# Patient Record
Sex: MALE | Race: BLACK OR AFRICAN AMERICAN | NOT HISPANIC OR LATINO | Employment: UNEMPLOYED | URBAN - METROPOLITAN AREA
[De-identification: names, ages, dates, MRNs, and addresses within clinical notes are randomized per-mention and may not be internally consistent; named-entity substitution may affect disease eponyms.]

---

## 2021-03-01 ENCOUNTER — HISTORICAL (OUTPATIENT)
Dept: RADIOLOGY | Facility: HOSPITAL | Age: 46
End: 2021-03-01

## 2021-09-20 ENCOUNTER — HISTORICAL (OUTPATIENT)
Dept: ADMINISTRATIVE | Facility: HOSPITAL | Age: 46
End: 2021-09-20

## 2021-12-16 ENCOUNTER — PATIENT OUTREACH (OUTPATIENT)
Dept: EMERGENCY MEDICINE | Facility: HOSPITAL | Age: 46
End: 2021-12-16

## 2021-12-29 ENCOUNTER — HISTORICAL (OUTPATIENT)
Dept: PHYSICAL THERAPY | Facility: HOSPITAL | Age: 46
End: 2021-12-29

## 2022-01-26 ENCOUNTER — HISTORICAL (OUTPATIENT)
Dept: PHYSICAL THERAPY | Facility: HOSPITAL | Age: 47
End: 2022-01-26

## 2022-02-11 ENCOUNTER — PATIENT OUTREACH (OUTPATIENT)
Dept: EMERGENCY MEDICINE | Facility: HOSPITAL | Age: 47
End: 2022-02-11

## 2022-05-13 ENCOUNTER — PATIENT OUTREACH (OUTPATIENT)
Dept: EMERGENCY MEDICINE | Facility: HOSPITAL | Age: 47
End: 2022-05-13
Payer: MEDICAID

## 2022-05-24 ENCOUNTER — PATIENT OUTREACH (OUTPATIENT)
Dept: EMERGENCY MEDICINE | Facility: HOSPITAL | Age: 47
End: 2022-05-24
Payer: MEDICAID

## 2022-11-04 ENCOUNTER — IMMUNIZATION (OUTPATIENT)
Dept: PHARMACY | Facility: CLINIC | Age: 47
End: 2022-11-04
Payer: MEDICAID

## 2022-11-27 ENCOUNTER — HOSPITAL ENCOUNTER (EMERGENCY)
Facility: HOSPITAL | Age: 47
Discharge: HOME | End: 2022-11-27
Attending: STUDENT IN AN ORGANIZED HEALTH CARE EDUCATION/TRAINING PROGRAM
Payer: MEDICAID

## 2022-11-27 VITALS
SYSTOLIC BLOOD PRESSURE: 130 MMHG | RESPIRATION RATE: 20 BRPM | BODY MASS INDEX: 25.92 KG/M2 | WEIGHT: 175 LBS | OXYGEN SATURATION: 100 % | TEMPERATURE: 98 F | DIASTOLIC BLOOD PRESSURE: 83 MMHG | HEIGHT: 69 IN | HEART RATE: 74 BPM

## 2022-11-27 DIAGNOSIS — L02.811 ABSCESS, SCALP: Primary | ICD-10-CM

## 2022-11-27 PROCEDURE — 99283 EMERGENCY DEPT VISIT LOW MDM: CPT

## 2022-11-27 RX ORDER — CLINDAMYCIN HYDROCHLORIDE 300 MG/1
300 CAPSULE ORAL EVERY 6 HOURS
Qty: 40 CAPSULE | Refills: 0 | Status: SHIPPED | OUTPATIENT
Start: 2022-11-27 | End: 2022-12-07

## 2022-11-27 NOTE — ED PROVIDER NOTES
"Encounter Date: 11/27/2022       History     Chief Complaint   Patient presents with    Headache     Patient reports a knot in back of head for three days worst upon palpation no redness denies fall or injury.     46 M presents to Ed stating he has a "knot" in back of his head x 3 days. Woke up with it there, denies any trauma or inciting event. Shaved head a few days ago. No fevers, chills, bleeding or drainage. No dizziness, syncope.    Review of patient's allergies indicates:  No Known Allergies  No past medical history on file.  No past surgical history on file.  No family history on file.     Review of Systems   Skin:         Neg except as stated   Neurological:  Positive for headaches.   All other systems reviewed and are negative.    Physical Exam     Initial Vitals [11/27/22 0944]   BP Pulse Resp Temp SpO2   130/83 74 20 98.2 °F (36.8 °C) 100 %      MAP       --         Physical Exam    Constitutional: He appears well-developed and well-nourished.   HENT:   Head: Normocephalic.   Eyes: EOM are normal. Pupils are equal, round, and reactive to light.   Neck:   Normal range of motion.  Cardiovascular:  Normal rate, regular rhythm and normal pulses.           Pulmonary/Chest: Breath sounds normal. No respiratory distress.   Abdominal: Abdomen is soft. Bowel sounds are normal. There is no abdominal tenderness.   Musculoskeletal:         General: Normal range of motion.      Cervical back: Normal range of motion.     Neurological: He is alert.   Skin: Skin is warm. Capillary refill takes less than 2 seconds.   2cm abscess palpated to R occipital scalp, no erythema, drainage or bleeding, +indurated, edematous tissue   Psychiatric: He has a normal mood and affect.       ED Course   Procedures  Labs Reviewed - No data to display       Imaging Results    None          Medications - No data to display                           Clinical Impression:   Final diagnoses:  [L02.811] Abscess, scalp (Primary)        ED " Disposition Condition    Discharge Stable          ED Prescriptions       Medication Sig Dispense Start Date End Date Auth. Provider    clindamycin (CLEOCIN) 300 MG capsule Take 1 capsule (300 mg total) by mouth every 6 (six) hours. for 10 days 40 capsule 11/27/2022 12/7/2022 Chloe Ellis MD          Follow-up Information       Follow up With Specialties Details Why Contact Info    pcp                 Chloe Ellis MD  11/27/22 4011

## 2022-11-27 NOTE — ED NOTES
Pt reports small area of tenderness/swelling he noted x3 days now to rt occiptal region of scalp.- denies trauma. Skin intact/free of drianage.

## 2022-12-22 NOTE — PROGRESS NOTES
Original encounter on date 2/11/2022 in [employer] EMR.  Information placed in EPIC for continuity purposes.  First follow up.               Document Has Been Revised    HealthE Care Entered On:  2/11/2022 9:52 CST    Performed On:  2/11/2022 9:37 CST by Lu Kwan LPN               Discharge Past 30 Days   Visit to the Hospital in the Last 30 Days :   Emergency department (ED) visit   Reason for Choosing ED for Care :   Believes the problem too serious for doctor office or clinic   Perception of Change in Health Status DC :   Improving   Discharged To :   Home independently   DC Instructions :   Received discharge instructions , Understands discharge instructions    Education Provided :   Chronic disease process, ED utilization, Importance of keeping appointments, Community resources, Medication Compliance, Diet Compliance, Other: benefits of pcp and all providers and ancillary care. stressed compliance with medication, health care and treatment plan.   (Comment: diabetic diet, drink water [Lu Kwan LPN - 2/11/2022 9:37 CST] )   Discharge Past 30 Days Addntl Comments :   spoke to pt for f/u ed visit, pt reports he is feeling better and had f/u appt with pcp on yesterday 2/10/22 and did on his way to obtain lab work for pcp and that pcp will call him with f/u appt. pt reports cbg 328 this am. stressed importance of f/u care with pcp, all providers and ancillary care. discussed risk of non compliant to health care and treatment plan and encouraged health care and treatment plan compliance, monitor and log blood sugar and smoke cessation. pt reports he now realize that he has to and started taking his medication as directed and checking his blood sugars.  pt voices understanding to instructions given and appreciation.     Patient pregnant :   N/A   Lu Kwan LPN - 2/11/2022 9:37 CST   Barriers to Care   SDoH Eat Less Than You Should :   No   SDoH Shut Off Services to Your Home  :   No   SDoH No Regular Place to Live :   No   SDoH Needed Provider but Costs too Much :   No   SDoH Help Reading and Writing Paper Work :   No   SDoH Feel Lonely Often :   No   SDoH Missed Appt/Meds- No Transportation :   No   SDoH Call Dr To Be Seen Right Away :   Yes   SDoH Medical Problems Cause ED Visit :   No   SDoH Other Problems Affecting Health :   No   SDoH Reviewed :   Yes   SDoH Dentist Seen in Last Year :   Yes   Lu Kwan LPN - 2/11/2022 9:37 CST   Prescriptions   Medication Reconcilation Completed :   Unknown   Medication Prescriptions Filled After DC :   No medications prescribed at discharge   Questions About Meds Prescribed at DC :   Denies any questions or concerns                    Lu Kwan LPN - 2/11/2022 9:37 CST   Appointments   Follow-Up Appt Scheduled :   No   Follow-Up Appointment Status :   Appointment not available within the designated time frame   PCP Visit Upcoming Reason :   Sick   PCP Visit Within Year :   Yes   PCP Visit Upcoming Reason :   Sick   PCP Visit One Year Date :   2/10/2022 CST   Follow-Up Specialist Appt Scheduled :   No   Lu Kwan LPN - 2/11/2022 9:37 CST   Navigation   Initial Assesment Completed By :   Phone   ED FIN :   [id number]   [facility] Navigation Call Log :   First follow up call   Transportation Arrangements Made :   Yes   Lu Kwan LPN - 2/11/2022 9:37 CST   Providers Patient Visited Last Year :   MICAH LAYNE MD  [facility] DENTIST   [facility]   Lu Kwan LPN - 2/11/2022 10:05 CST   Root Causes for High-ED Utilization :   Chronic conditions   Plan: :   Educated on appropriate ED utilization, Educated on alternate means of health care; ie urgent care when PCP not available, Educated on importance of follow up care with PCP, Educated on importance of follow up preventative dental care with dentist, Other: PT REPORTS HE DID NOT RECEIVE MAIL YET,  BUT DID NOT CHECK AT HIS MOM HOUSE, WHERE HE REQUEST TO SEND MAIL AND REQUEST TO SEND IT AGAIN, VERIFIED PT ADDRESS WITH PT   Participation in Activity Designed to Address Lack of Annual Ambulatory or Preventative Care Visit? :   Yes   Participation in Activity Designed to Address Avoidable ED Utilization? :   Yes   During Current Measurement Year, Did Enrollee Receive Education Regarding Outpatient Primary Care Options? :   Yes   During Current Measurement Year, Did Enrollee Receive an Appt Reminder 24-48 Hours Before a Scheduled Appt? :   Yes   During Current Measurement Year, Did the Network Provider Schedule and Appt or Provide a Referral to Enrollee? :   Yes   Education Provided :   Verbal, Written   Lu Kwan LPN - 2/11/2022 9:37 CST

## 2022-12-22 NOTE — PROGRESS NOTES
Original encounter on date 12/16/2021 in [employer] EMR.  Information placed in EPIC for continuity purposes.                 HealthE Care Entered On:  12/16/2021 9:17 CST    Performed On:  12/16/2021 8:58 CST by Lu Kwan LPN               Discharge Past 30 Days   Visit to the Hospital in the Last 30 Days :   Emergency department (ED) visit   Reason for Choosing ED for Care :   Believes the problem too serious for doctor office or clinic   Perception of Change in Health Status DC :   Improving   Discharged To :   Home independently   DC Instructions :   Received discharge instructions , Understands discharge instructions    Education Provided :   Chronic disease process, ED utilization, Importance of keeping appointments, Community resources, Medication Compliance, Diet Compliance, Other: BENEFITS OF PCP AND ANCILLARY CARE, ORAL CARE/DENTAL PROVIDER OPTIONS   (Comment: DIABETE DIET [Lu Kwan LPN - 12/16/2021 8:58 CST] )   Discharge Past 30 Days Addntl Comments :   SPOKE TO PT FOR F/U ED VISIT. PT REPORTS HE IS FEELING BETTER, SWELLING WENT DOWN AND NO MORE PAIN, PT REPORTS HE DID NOT  RXS YET FROM PHARM AND WILL  THIS AM. STRESSED IMPORTANCE OF MEDICATION  AND DIABETIC DIET COMPLIANCE AND DRINKING  WATER. PT REPORTS THAT DR. MICAH LAYNE IS HIS PCP AND LAST SEEN ON YESTERDAY 12/15/21 PRIOR TO ED VISIT AND NEXT APPT IS 12/29/21. PT ALSO REPORTS HE IS A DIABETIC AND IS TAKING METFORMIN AND HE HAS A GLUCOMETER, BUT DOES NOT CHECK LIKE HE SHOULD. ENCOURAGED PT TO MONITOR BLOOD SUGARS AND LOG AND BRING TO PCP APPTS FOR PROVIDER TO REVIEW. STRESSED IMPORTANCE OF F/U CARE WITH ALL PROVIDERS AND ANCILLARY CARE. [facility] AND CONTINUE F/U CALLS AND REQUEST TO MAIL SDOH EDUCATION/RESOURCE DISCUSSED. PT VOICES UNDERSTANDING TO INSTRUCTIONS GIVEN AND APPRECIATION.     Lu Kwan LPN - 12/16/2021 8:58 CST   Barriers to Care   SDoH Eat Less Than You Should :    No   SDoH Shut Off Services to Your Home :   No   SDoH No Regular Place to Live :   No   SDoH Needed Provider but Costs too Much :   No   SDoH Help Reading and Writing Paper Work :   No   SDoH Feel Lonely Often :   No   SDoH Missed Appt/Meds- No Transportation :   No   SDoH Call Dr To Be Seen Right Away :   Yes   SDoH Medical Problems Cause ED Visit :   No   SDoH Other Problems Affecting Health :   No   SDoH Reviewed :   Yes   SDoH Dentist Seen in Last Year :   Yes   (Comment: [facility] DENTIST [Lu Kwan LPN - 12/16/2021 8:58 CST] )   Lu Kwan LPN - 12/16/2021 8:58 CST   Prescriptions   Medication Reconcilation Completed :   Unknown   Medication Prescriptions Filled After DC :   Other: STRESSED IMPORTANCE FOR PT TO  RXS GIVEN FROM PHARM AND TAKE AS DIRECTED   Questions About Meds Prescribed at DC :   Denies any questions or concerns                    Lu Kwan LPN - 12/16/2021 8:58 CST   Appointments   Follow-Up Appt Scheduled :   Yes   Follow-Up Provider Appt Scheduled By :   Other: OFFICE STAFF   PCP Name :   MICAH LAYNE MD   Follow-Up Date :   12/29/2021 CST   Follow-Up Appointment Status :   Confirms scheduled appointment    PCP Visit Within Year :   Yes   PCP Visit Upcoming Reason :   Regular visit   PCP Visit One Year Date :   12/15/2021 CST   Follow-Up Specialist Appt Scheduled :   No   Lu Kwan LPN - 12/16/2021 8:58 CST   Navigation   Initial Assesment Completed By :   Phone   ED FIN :   [id number]   [facility] Navigation Call Log :   Initial [facility] contact   Referral To  Care :   [facility] Navigation   Transportation Arrangements Made :   Yes   Providers Patient Visited Last Year :   MICAH LAYNE MD  [facility] DENTIST   Root Causes for High-ED Utilization :   Chronic conditions   Plan: :   Educated on appropriate ED utilization, Educated on alternate means of health care; ie urgent care  when PCP not available, Educated on importance of follow up care with PCP, Referred to community resources; ie [place], Educated on importance of follow up preventative dental care with dentist, Other: MAIL [facility] EDUCATION/RESOURCE TO PT PER PT REQUEST, VERIFIED PT ADDRESS WITH PT   Participation in Activity Designed to Address Lack of Annual Ambulatory or Preventative Care Visit? :   Yes   Participation in Activity Designed to Address Avoidable ED Utilization? :   Yes   During Current Measurement Year, Did Enrollee Receive Education Regarding Outpatient Primary Care Options? :   Yes   During Current Measurement Year, Did Enrollee Receive an Appt Reminder 24-48 Hours Before a Scheduled Appt? :   Yes   During Current Measurement Year, Did the Network Provider Schedule and Appt or Provide a Referral to Enrollee? :   Yes   Education Provided :   Verbal, Written   Aquiles HINOJOSA, Lu Fu - 12/16/2021 8:58 CST

## 2023-05-20 ENCOUNTER — HOSPITAL ENCOUNTER (EMERGENCY)
Facility: HOSPITAL | Age: 48
Discharge: HOME | End: 2023-05-20
Attending: STUDENT IN AN ORGANIZED HEALTH CARE EDUCATION/TRAINING PROGRAM
Payer: MEDICAID

## 2023-05-20 VITALS
DIASTOLIC BLOOD PRESSURE: 82 MMHG | OXYGEN SATURATION: 100 % | BODY MASS INDEX: 26.22 KG/M2 | TEMPERATURE: 98 F | SYSTOLIC BLOOD PRESSURE: 153 MMHG | HEIGHT: 69 IN | RESPIRATION RATE: 18 BRPM | HEART RATE: 88 BPM | WEIGHT: 177 LBS

## 2023-05-20 DIAGNOSIS — M25.551 PAIN OF RIGHT HIP: Primary | ICD-10-CM

## 2023-05-20 PROCEDURE — 96372 THER/PROPH/DIAG INJ SC/IM: CPT | Performed by: STUDENT IN AN ORGANIZED HEALTH CARE EDUCATION/TRAINING PROGRAM

## 2023-05-20 PROCEDURE — 99284 EMERGENCY DEPT VISIT MOD MDM: CPT | Mod: 25

## 2023-05-20 PROCEDURE — 63600175 PHARM REV CODE 636 W HCPCS: Performed by: STUDENT IN AN ORGANIZED HEALTH CARE EDUCATION/TRAINING PROGRAM

## 2023-05-20 RX ORDER — DEXAMETHASONE SODIUM PHOSPHATE 4 MG/ML
8 INJECTION, SOLUTION INTRA-ARTICULAR; INTRALESIONAL; INTRAMUSCULAR; INTRAVENOUS; SOFT TISSUE
Status: COMPLETED | OUTPATIENT
Start: 2023-05-20 | End: 2023-05-20

## 2023-05-20 RX ORDER — KETOROLAC TROMETHAMINE 30 MG/ML
60 INJECTION, SOLUTION INTRAMUSCULAR; INTRAVENOUS
Status: COMPLETED | OUTPATIENT
Start: 2023-05-20 | End: 2023-05-20

## 2023-05-20 RX ORDER — DICLOFENAC SODIUM 75 MG/1
75 TABLET, DELAYED RELEASE ORAL 2 TIMES DAILY
Qty: 10 TABLET | Refills: 0 | Status: SHIPPED | OUTPATIENT
Start: 2023-05-20 | End: 2023-05-25

## 2023-05-20 RX ADMIN — KETOROLAC TROMETHAMINE 60 MG: 30 INJECTION, SOLUTION INTRAMUSCULAR at 07:05

## 2023-05-20 RX ADMIN — DEXAMETHASONE SODIUM PHOSPHATE 8 MG: 4 INJECTION, SOLUTION INTRA-ARTICULAR; INTRALESIONAL; INTRAMUSCULAR; INTRAVENOUS; SOFT TISSUE at 07:05

## 2023-05-27 NOTE — ED PROVIDER NOTES
Encounter Date: 5/20/2023       History     Chief Complaint   Patient presents with    Hip Pain     R hip pain x 1 day     48 yo M presents to R hip pain x 1 day. H/o OA, had previous injections, was told he needs hip replacement. Due for FU with Ortho    Review of patient's allergies indicates:  No Known Allergies  History reviewed. No pertinent past medical history.  History reviewed. No pertinent surgical history.  No family history on file.  Social History     Tobacco Use    Smoking status: Never    Smokeless tobacco: Never   Substance Use Topics    Alcohol use: Never    Drug use: Yes     Types: Marijuana     Review of Systems   Constitutional:  Negative for fever.   HENT:  Negative for sore throat.    Respiratory:  Negative for shortness of breath.    Cardiovascular:  Negative for chest pain.   Gastrointestinal:  Negative for nausea.   Genitourinary:  Negative for dysuria.   Musculoskeletal:  Positive for arthralgias. Negative for back pain.   Skin:  Negative for rash.   Neurological:  Negative for weakness.   Hematological:  Does not bruise/bleed easily.     Physical Exam     Initial Vitals [05/20/23 0712]   BP Pulse Resp Temp SpO2   (!) 153/82 92 18 98.3 °F (36.8 °C) 99 %      MAP       --         Physical Exam    Nursing note and vitals reviewed.  Constitutional: He appears well-developed and well-nourished.   HENT:   Head: Normocephalic.   Eyes: EOM are normal. Pupils are equal, round, and reactive to light.   Neck:   Normal range of motion.  Cardiovascular:  Normal rate, regular rhythm and normal pulses.           Pulmonary/Chest: Breath sounds normal. No respiratory distress.   Abdominal: Abdomen is soft. Bowel sounds are normal. There is no abdominal tenderness.   Musculoskeletal:         General: Tenderness (L hip) present. Normal range of motion.      Cervical back: Normal range of motion.     Neurological: He is alert.   Skin: Skin is warm. Capillary refill takes less than 2 seconds.   Psychiatric: He  has a normal mood and affect.       ED Course   Procedures  Labs Reviewed - No data to display       Imaging Results    None          Medications   dexAMETHasone injection 8 mg (8 mg Intramuscular Given 23 07)   ketorolac injection 60 mg (60 mg Intramuscular Given 23)     Medical Decision Making:   Differential Diagnosis:   Hip OA, sciatica, other joint/ligamentous injury  ED Management:  No need for imaging at this time, longstanding issue with exacerbation  Pain improved with tx in ER  FU with Ortho  Er precautions                        Clinical Impression:   Final diagnoses:  [M25.551] Pain of right hip (Primary)        ED Disposition Condition    Discharge Stable          ED Prescriptions       Medication Sig Dispense Start Date End Date Auth. Provider    diclofenac (VOLTAREN) 75 MG EC tablet () Take 1 tablet (75 mg total) by mouth 2 (two) times daily. for 5 days 10 tablet 2023 Chloe Ellis MD          Follow-up Information       Follow up With Specialties Details Why Contact Info    Orthopedics  Schedule an appointment as soon as possible for a visit                Chloe Ellis MD  23 [phone]

## 2023-08-07 ENCOUNTER — HOSPITAL ENCOUNTER (EMERGENCY)
Facility: HOSPITAL | Age: 48
Discharge: HOME | End: 2023-08-07
Attending: EMERGENCY MEDICINE
Payer: MEDICAID

## 2023-08-07 VITALS
WEIGHT: 175 LBS | HEIGHT: 69 IN | OXYGEN SATURATION: 99 % | TEMPERATURE: 98 F | RESPIRATION RATE: 17 BRPM | HEART RATE: 82 BPM | BODY MASS INDEX: 25.92 KG/M2 | DIASTOLIC BLOOD PRESSURE: 68 MMHG | SYSTOLIC BLOOD PRESSURE: 105 MMHG

## 2023-08-07 DIAGNOSIS — G44.219 EPISODIC TENSION-TYPE HEADACHE, NOT INTRACTABLE: Primary | ICD-10-CM

## 2023-08-07 PROCEDURE — 99284 EMERGENCY DEPT VISIT MOD MDM: CPT | Mod: 25

## 2023-08-07 PROCEDURE — 25000003 PHARM REV CODE 250

## 2023-08-07 RX ORDER — BUTALBITAL, ACETAMINOPHEN AND CAFFEINE 50; 325; 40 MG/1; MG/1; MG/1
1 TABLET ORAL
Status: COMPLETED | OUTPATIENT
Start: 2023-08-07 | End: 2023-08-07

## 2023-08-07 RX ORDER — DICLOFENAC SODIUM 50 MG/1
50 TABLET, DELAYED RELEASE ORAL 2 TIMES DAILY
Qty: 15 TABLET | Refills: 0 | Status: SHIPPED | OUTPATIENT
Start: 2023-08-07

## 2023-08-07 RX ORDER — CHLORHEXIDINE GLUCONATE ORAL RINSE 1.2 MG/ML
15 SOLUTION DENTAL 2 TIMES DAILY
Qty: 118 ML | Refills: 0 | Status: SHIPPED | OUTPATIENT
Start: 2023-08-07 | End: 2023-08-21

## 2023-08-07 RX ADMIN — BUTALBITAL, ACETAMINOPHEN, AND CAFFEINE 1 TABLET: 325; 50; 40 TABLET ORAL at 01:08

## 2023-08-07 NOTE — ED PROVIDER NOTES
Encounter Date: 8/7/2023       History     Chief Complaint   Patient presents with    Headache     Pt c/o intermittent frontal HA that started 4 days ago. Pt denies n/v/d and cough/ congestion.      47-year-old male presents to the ER with a 4 day history of headache    The history is provided by the patient. No  was used.     Review of patient's allergies indicates:  No Known Allergies  No past medical history on file.  No past surgical history on file.  No family history on file.  Social History     Tobacco Use    Smoking status: Never    Smokeless tobacco: Never   Substance Use Topics    Alcohol use: Never    Drug use: Yes     Types: Marijuana     Review of Systems   Constitutional: Negative.    HENT:  Positive for sinus pressure.    Eyes: Negative.    Respiratory: Negative.     Cardiovascular: Negative.    Gastrointestinal: Negative.    Endocrine: Negative.    Genitourinary: Negative.    Musculoskeletal: Negative.    Neurological:  Positive for headaches.   Hematological: Negative.    Psychiatric/Behavioral: Negative.     All other systems reviewed and are negative.      Physical Exam     Initial Vitals [08/07/23 1325]   BP Pulse Resp Temp SpO2   135/84 82 17 98.3 °F (36.8 °C) 99 %      MAP       --         Physical Exam    Nursing note and vitals reviewed.  Constitutional: He appears well-developed and well-nourished.   HENT:   Head: Normocephalic and atraumatic.   Right Ear: External ear normal.   Left Ear: External ear normal.   Nose: Nose normal.   Mouth/Throat: Oropharynx is clear and moist.   Eyes: Conjunctivae and EOM are normal. Pupils are equal, round, and reactive to light.   Neck: Neck supple.   Normal range of motion.  Cardiovascular:  Normal rate, regular rhythm, normal heart sounds and intact distal pulses.           Pulmonary/Chest: Breath sounds normal.   Abdominal: Abdomen is soft. Bowel sounds are normal.   Musculoskeletal:         General: Tenderness present. Normal range  of motion.      Cervical back: Normal range of motion and neck supple.     Neurological: He is alert and oriented to person, place, and time. He has normal strength and normal reflexes. GCS score is 15. GCS eye subscore is 4. GCS verbal subscore is 5. GCS motor subscore is 6.   Skin: Skin is warm and dry. Capillary refill takes less than 2 seconds.   Psychiatric: He has a normal mood and affect. His behavior is normal. Judgment and thought content normal.         ED Course   Procedures  Labs Reviewed - No data to display       Imaging Results              CT Head Without Contrast (Final result)  Result time 08/07/23 14:11:36      Final result by Ade Escudero MD (08/07/23 14:11:36)                   Impression:      No acute abnormality seen      Electronically signed by: Ade Escudero  Date:    08/07/2023  Time:    14:11               Narrative:    EXAMINATION:  CT HEAD WITHOUT CONTRAST    CLINICAL HISTORY:  Headache, chronic, new features or increased frequency;    TECHNIQUE:  Multiple axial images were obtained from the base of the brain to the vertex without contrast administration.  Sagittal and coronal reconstructions were performed. .Automatic exposure control  (AEC) is utilized to reduce patient radiation exposure.    COMPARISON:  None    FINDINGS:  There is no intracranial mass or lesion seen.  No hemorrhage is seen.  No infarct is seen.  The ventricles and basilar cisterns appear normal.  Brain parenchyma appears grossly unremarkable.    Posterior fossa appears normal.  The calvarium is intact.  The paranasal sinuses appear grossly unremarkable.                                       Medications   butalbital-acetaminophen-caffeine -40 mg per tablet 1 tablet (1 tablet Oral Given 8/7/23 0075)     Medical Decision Making:   Initial Assessment:   Awake alert and oriented 47-year-old male appears in no acute distress with complaints of a 4 day history of intermittent headache.  Patient  reports headache mainly to the left temporal region.  Patient denies any difficulty with vision.  Vital signs stable.  Afebrile.  Patient reports he has been working on [employer] and heat.PERRLA, EOMI.  No nystagmus or ptosis noted.  No decreased hearing.  Temporal lobes and frontal sinuses.  Bilateral ear canals clear tympanic membranes present.  Denies any muscle weakness, numbness or tingling.  Denies any trauma.  Neck supple full range of motion.  Moves all extremities with good strength distal motor sensory intact good pulses GCS 15 cranial nerves 2-12 intact no focal neuro deficits.  Positive DTRs.  Normal speech gait.  Negative Romberg.  Speech clear gait steady   Differential Diagnosis:   Tension Headache  Sinusitis  TMJ  Clinical Tests:   Radiological Study: Ordered  ED Management:  Patient reports no improvement in pain.  Patient does have several broken teeth and poor dentition.  Mild clicking appreciated at the left temporomandibular joint.  Patient instructed to follow up with a dentist and take anti-inflammatories and instructed                          Clinical Impression:   Final diagnoses:  [G44.219] Episodic tension-type headache, not intractable (Primary)        ED Disposition Condition    Discharge Stable          ED Prescriptions       Medication Sig Dispense Start Date End Date Auth. Provider    chlorhexidine (PERIDEX) 0.12 % solution Use as directed 15 mLs in the mouth or throat 2 (two) times daily. for 14 days 118 mL 8/7/2023 8/21/2023 Katharine Erwin NP    diclofenac (VOLTAREN) 50 MG EC tablet Take 1 tablet (50 mg total) by mouth 2 (two) times daily. 15 tablet 8/7/2023 -- Katharine Erwin NP          Follow-up Information    None          Katharien Erwin NP  08/07/23 2341

## 2023-08-07 NOTE — DISCHARGE INSTRUCTIONS
Patient will be discharged home.  Instructed to follow up with a dentist for evaluation teeth and TMJ.  Take anti-inflammatories as prescribed.  Warm saltwater gargles.  Along with the oral solution.  Return ER for any worsening condition

## 2024-04-08 ENCOUNTER — HOSPITAL ENCOUNTER (EMERGENCY)
Facility: HOSPITAL | Age: 49
Discharge: HOME | End: 2024-04-08
Attending: FAMILY MEDICINE
Payer: MEDICAID

## 2024-04-08 VITALS
RESPIRATION RATE: 18 BRPM | HEART RATE: 90 BPM | DIASTOLIC BLOOD PRESSURE: 84 MMHG | TEMPERATURE: 98 F | OXYGEN SATURATION: 98 % | HEIGHT: 69 IN | WEIGHT: 165 LBS | BODY MASS INDEX: 24.44 KG/M2 | SYSTOLIC BLOOD PRESSURE: 148 MMHG

## 2024-04-08 DIAGNOSIS — M79.606 PAIN AND SWELLING OF LOWER EXTREMITY: ICD-10-CM

## 2024-04-08 DIAGNOSIS — M79.89 PAIN AND SWELLING OF LOWER EXTREMITY: ICD-10-CM

## 2024-04-08 DIAGNOSIS — B35.3 TINEA PEDIS OF LEFT FOOT: ICD-10-CM

## 2024-04-08 DIAGNOSIS — M79.672 FOOT PAIN, LEFT: Primary | ICD-10-CM

## 2024-04-08 PROCEDURE — 99284 EMERGENCY DEPT VISIT MOD MDM: CPT | Mod: 25

## 2024-04-08 RX ORDER — METFORMIN HYDROCHLORIDE 500 MG/1
500 TABLET, EXTENDED RELEASE ORAL 2 TIMES DAILY
COMMUNITY
Start: 2024-04-01

## 2024-04-08 RX ORDER — CLOTRIMAZOLE 1 %
CREAM (GRAM) TOPICAL 2 TIMES DAILY
Qty: 15 G | Refills: 0 | Status: SHIPPED | OUTPATIENT
Start: 2024-04-08

## 2024-04-08 RX ORDER — DICLOFENAC SODIUM 50 MG/1
50 TABLET, DELAYED RELEASE ORAL 3 TIMES DAILY PRN
Qty: 21 TABLET | Refills: 0 | Status: SHIPPED | OUTPATIENT
Start: 2024-04-08 | End: 2024-04-15

## 2024-04-08 RX ORDER — CEPHALEXIN 500 MG/1
500 CAPSULE ORAL EVERY 8 HOURS
Qty: 21 CAPSULE | Refills: 0 | Status: SHIPPED | OUTPATIENT
Start: 2024-04-08 | End: 2024-04-15

## 2024-04-08 NOTE — Clinical Note
"Alfosno Barrera"James was seen and treated in our emergency department on 4/8/2024.  He may return to work on 04/10/2024.       If you have any questions or concerns, please don't hesitate to call.      Vanessa Graves, JULIETH"

## 2024-04-08 NOTE — DISCHARGE INSTRUCTIONS
Dry space between toes well then apply Lotrimin cream  Cephalexin as directed  Diclofenac as needed for pain

## 2024-04-08 NOTE — ED PROVIDER NOTES
Encounter Date: 4/8/2024       History     Chief Complaint   Patient presents with    Foot Pain     Left foot pain for three days denies injury or trauma     48 year old female presents to ER complaining of pain to left foot overlying the distal left 4th/5th MTP. He denies injury. He states he works at [employer] and is constantly bumping himself. He is a diabetic.     The history is provided by the patient. No  was used.     Review of patient's allergies indicates:  No Known Allergies  Past Medical History:   Diagnosis Date    Diabetes mellitus      History reviewed. No pertinent surgical history.  No family history on file.  Social History     Tobacco Use    Smoking status: Never    Smokeless tobacco: Never   Substance Use Topics    Alcohol use: Never    Drug use: Yes     Types: Marijuana     Review of Systems   Constitutional:  Negative for fever.   Musculoskeletal:  Positive for arthralgias.   Skin:  Negative for color change and wound.   All other systems reviewed and are negative.      Physical Exam     Initial Vitals [04/08/24 1452]   BP Pulse Resp Temp SpO2   (!) 148/84 90 18 98.2 °F (36.8 °C) 98 %      MAP       --         Physical Exam    Constitutional: He appears well-developed and well-nourished.   HENT:   Head: Normocephalic.   Eyes: EOM are normal.   Neck: Neck supple.   Cardiovascular:  Intact distal pulses.           Pulmonary/Chest: No respiratory distress.   Abdominal: He exhibits no distension.   Musculoskeletal:         General: Tenderness present. Normal range of motion.      Cervical back: Neck supple.        Legs:      Neurological: He is alert and oriented to person, place, and time.   Skin: Capillary refill takes less than 2 seconds. Rash noted. No erythema.        Psychiatric: He has a normal mood and affect.         ED Course   Procedures  Labs Reviewed - No data to display       Imaging Results              X-Ray Foot Complete Left (Preliminary result)  Result time  04/08/24 15:41:58      Wet Read by Vanessa Graves FNP (04/08/24 15:41:58, [facility] - Emergency Dept, Emergency Medicine)    No fracture, dislocation or foreign body                                     Medications - No data to display  Medical Decision Making  DDX: contusion, closed fracture, fungal infection, cellulitis    48-year-old male complaining of left foot pain.  No obvious deformity.  Mild tenderness over the distal 4th and 5th MTPs.  Patient does have a slight fungal infection of the webspaces.  He is diabetic.  X-rays without fracture foreign body.  Will treat the fungal infection and put patient on Keflex in case of an early secondary bacterial infection given he is diabetic.    Amount and/or Complexity of Data Reviewed  Radiology: ordered. Decision-making details documented in ED Course.                                      Clinical Impression:  Final diagnoses:  [M79.606, M79.89] Pain and swelling of lower extremity  [M79.672] Foot pain, left (Primary)  [B35.3] Tinea pedis of left foot          ED Disposition Condition    Discharge Stable          ED Prescriptions       Medication Sig Dispense Start Date End Date Auth. Provider    diclofenac (VOLTAREN) 50 MG EC tablet Take 1 tablet (50 mg total) by mouth 3 (three) times daily as needed (pain). 21 tablet 4/8/2024 4/15/2024 Vanessa Graves FNP    clotrimazole (LOTRIMIN) 1 % cream Apply topically 2 (two) times daily. 15 g 4/8/2024 -- Vanessa Graves FNP    cephALEXin (KEFLEX) 500 MG capsule Take 1 capsule (500 mg total) by mouth every 8 (eight) hours. for 7 days 21 capsule 4/8/2024 4/15/2024 Vanessa Graves FNP          Follow-up Information    None          Vanessa Graves FNP  04/08/24 7479

## 2025-06-26 ENCOUNTER — HOSPITAL ENCOUNTER (EMERGENCY)
Facility: HOSPITAL | Age: 50
Discharge: HOME | End: 2025-06-26
Attending: STUDENT IN AN ORGANIZED HEALTH CARE EDUCATION/TRAINING PROGRAM
Payer: MEDICAID

## 2025-06-26 VITALS
WEIGHT: 178 LBS | SYSTOLIC BLOOD PRESSURE: 148 MMHG | OXYGEN SATURATION: 98 % | BODY MASS INDEX: 26.36 KG/M2 | TEMPERATURE: 98 F | DIASTOLIC BLOOD PRESSURE: 84 MMHG | HEIGHT: 69 IN | HEART RATE: 78 BPM | RESPIRATION RATE: 18 BRPM

## 2025-06-26 DIAGNOSIS — R52 PAIN: ICD-10-CM

## 2025-06-26 DIAGNOSIS — M25.551 PAIN OF RIGHT HIP JOINT: Primary | ICD-10-CM

## 2025-06-26 PROCEDURE — 63600175 PHARM REV CODE 636 W HCPCS: Mod: JZ,TB | Performed by: STUDENT IN AN ORGANIZED HEALTH CARE EDUCATION/TRAINING PROGRAM

## 2025-06-26 PROCEDURE — 99284 EMERGENCY DEPT VISIT MOD MDM: CPT | Mod: 25

## 2025-06-26 PROCEDURE — 96372 THER/PROPH/DIAG INJ SC/IM: CPT | Performed by: STUDENT IN AN ORGANIZED HEALTH CARE EDUCATION/TRAINING PROGRAM

## 2025-06-26 RX ORDER — KETOROLAC TROMETHAMINE 30 MG/ML
30 INJECTION, SOLUTION INTRAMUSCULAR; INTRAVENOUS
Status: COMPLETED | OUTPATIENT
Start: 2025-06-26 | End: 2025-06-26

## 2025-06-26 RX ORDER — KETOROLAC TROMETHAMINE 10 MG/1
10 TABLET, FILM COATED ORAL 3 TIMES DAILY
Qty: 15 TABLET | Refills: 0 | Status: SHIPPED | OUTPATIENT
Start: 2025-06-26 | End: 2025-07-01

## 2025-06-26 RX ADMIN — KETOROLAC TROMETHAMINE 30 MG: 30 INJECTION, SOLUTION INTRAMUSCULAR; INTRAVENOUS at 10:06

## 2025-06-26 NOTE — ED PROVIDER NOTES
Encounter Date: 6/26/2025       History     Chief Complaint   Patient presents with    Hip Pain     R hip pain x 2 days -denies injury -pain radiates to R post leg      Patient is a 49-year-old  gentleman with a history of osteoarthritis, diabetes who presented to the ER today due to right-sided hip pain.  He states he has had issues with his right hip for many years and he actually has such severe arthritis that he was recommended to have a hip replacement.  He states he went to see the orthopedic surgeon who scheduled him for surgery but patient states he never showed up.  Patient states that he would like to try conservative management only.  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  He has not tried anything for relief and states injections with Toradol often times work in the ER.  He denies any lower leg swelling or any direct trauma to the hip.  He denies any other complaints.      Review of patient's allergies indicates:  No Known Allergies  Past Medical History:   Diagnosis Date    Diabetes mellitus      History reviewed. No pertinent surgical history.  No family history on file.  Social History[1]  Review of Systems   Constitutional:  Negative for chills, fatigue and fever.   HENT:  Negative for congestion, sore throat and trouble swallowing.    Eyes:  Negative for pain and visual disturbance.   Respiratory:  Negative for cough, shortness of breath and wheezing.    Cardiovascular:  Negative for chest pain and palpitations.   Gastrointestinal:  Negative for abdominal pain, blood in stool, constipation, diarrhea, nausea and vomiting.   Genitourinary:  Negative for dysuria and hematuria.   Musculoskeletal:  Positive for arthralgias. Negative for back pain and myalgias.   Skin:  Negative for rash and wound.   Neurological:  Negative for seizures, syncope and headaches.   Psychiatric/Behavioral:  Negative for confusion.  The patient is not nervous/anxious.        Physical Exam     Initial Vitals [06/26/25 0958]   BP Pulse Resp Temp SpO2   (!) 151/91 94 18 97.9 °F (36.6 °C) 100 %      MAP       --         Physical Exam    Nursing note and vitals reviewed.  Constitutional: He appears well-developed and well-nourished. No distress.   HENT:   Head: Normocephalic and atraumatic.   Eyes: Conjunctivae and EOM are normal. Right eye exhibits no discharge. Left eye exhibits no discharge. No scleral icterus.   Neck: No tracheal deviation present.   Normal range of motion.  Cardiovascular:  Normal rate, regular rhythm and normal heart sounds.     Exam reveals no gallop and no friction rub.       No murmur heard.  Pulmonary/Chest: Breath sounds normal. No respiratory distress. He has no wheezes. He has no rhonchi. He has no rales.   Musculoskeletal:         General: No edema. Normal range of motion.      Cervical back: Normal range of motion.     Neurological: He is alert.   Skin: Skin is warm and dry. No rash and no abscess noted. No erythema. No pallor.   Psychiatric: His behavior is normal. Judgment normal.         ED Course   Procedures  Labs Reviewed - No data to display       Imaging Results              X-Ray Hip 2 or 3 views Right with Pelvis when performed (Final result)  Result time 06/26/25 11:03:12      Final result by Hussein Bowie MD (06/26/25 11:03:12)                   Impression:      No acute osseous process appreciated.      Electronically signed by: Hussein Bowie  Date:    06/26/2025  Time:    11:03               Narrative:    EXAMINATION:  XR HIP WITH PELVIS WHEN PERFORMED 2 OR 3 VIEWS RIGHT    CLINICAL HISTORY:  Pain, unspecified    TECHNIQUE:  AP view of the pelvis with frontal and frog leg lateral views of the right hip    COMPARISON:  Radiographs 03/01/2021    FINDINGS:  No acute fracture identified.  Right hip aligned.  No significant joint space narrowing.                                       Medications  "  ketorolac injection 30 mg (30 mg Intramuscular Given 6/26/25 1041)     Medical Decision Making  Differentials:  Arthritis, AVN, fracture  History was provided by the patient   49-year-old well-appearing male presents to the ER today with a chronic right-sided hip pain.  It all seem to be provoked with certain overuse.  Toradol given as patient responded well to this in the past and he verbalized improvement.  X-ray showed no acute osseous abnormalities.  Advised follow up and reestablishment of care with his orthopedic surgeon.  No clinical evidence of DVT on exam.  Toradol sent to pharmacy and rice therapy advised.  All questions were answered in layman terms, ER return precautions were discussed, and the importance of close follow up with PCP was recommended.     *Portions of this note may have been created with voice recognition software. Occasional "wrong-word" or "sound-a-like" substitutions may have occurred due to the inherent limitations of voice recognition software.  Please, read the note carefully and recognize, using context, where substitutions have occurred.         Amount and/or Complexity of Data Reviewed  Radiology: ordered. Decision-making details documented in ED Course.    Risk  Prescription drug management.                                      Clinical Impression:  Final diagnoses:  [R52] Pain  [M25.551] Pain of right hip joint (Primary)          ED Disposition Condition    Discharge Stable          ED Prescriptions       Medication Sig Dispense Start Date End Date Auth. Provider    ketorolac (TORADOL) 10 mg tablet Take 1 tablet (10 mg total) by mouth 3 (three) times daily. for 5 days 15 tablet 6/26/2025 7/1/2025 Tate Nguyễn MD          Follow-up Information       Follow up With Specialties Details Why Contact Kelly    [facility] - Emergency Dept Emergency Medicine  If symptoms worsen [address] [place] [id number] [phone]                   [1]   Social " History  Tobacco Use    Smoking status: Never    Smokeless tobacco: Never   Substance Use Topics    Alcohol use: Never    Drug use: Yes     Types: Marijuana        Tate Nguyễn MD  06/26/25 1122

## 2025-07-17 ENCOUNTER — HOSPITAL ENCOUNTER (OUTPATIENT)
Dept: RADIOLOGY | Facility: HOSPITAL | Age: 50
Discharge: HOME | End: 2025-07-17
Attending: FAMILY MEDICINE
Payer: MEDICAID

## 2025-07-17 DIAGNOSIS — G89.29 CHRONIC PAIN IN LEFT SHOULDER: ICD-10-CM

## 2025-07-17 DIAGNOSIS — M25.511 PAIN IN RIGHT SHOULDER: ICD-10-CM

## 2025-07-17 DIAGNOSIS — M25.512 CHRONIC PAIN IN LEFT SHOULDER: ICD-10-CM

## 2025-07-17 DIAGNOSIS — M25.511 PAIN IN RIGHT SHOULDER: Primary | ICD-10-CM

## 2025-07-17 PROCEDURE — 73030 X-RAY EXAM OF SHOULDER: CPT | Mod: TC,RT

## 2025-07-17 PROCEDURE — 73030 X-RAY EXAM OF SHOULDER: CPT | Mod: TC,LT
